# Patient Record
Sex: FEMALE | Race: BLACK OR AFRICAN AMERICAN | NOT HISPANIC OR LATINO | Employment: FULL TIME | URBAN - METROPOLITAN AREA
[De-identification: names, ages, dates, MRNs, and addresses within clinical notes are randomized per-mention and may not be internally consistent; named-entity substitution may affect disease eponyms.]

---

## 2024-10-08 ENCOUNTER — TELEPHONE (OUTPATIENT)
Dept: ORTHOPEDIC SURGERY | Facility: CLINIC | Age: 54
End: 2024-10-08

## 2024-10-08 NOTE — TELEPHONE ENCOUNTER
Caller: Neha Beauchamp    Relationship to patient: Self    Best call back number: [phone] (home)     Patient is needing: PATIENT WANTS TO MAKE SURE THAT THE OFFICE ACCEPTS FEDERAL WC CLAIMS.   THE PATIENT WILL BE COMING IN ON 11/18/24 FOR A WC INJURY BUT WAS TOLD BY HER EMPLOYER ([facility]) THAT SOME PARTS OF [facility] DO NOT ACCEPT WC FROM A FEDERAL EMPLOYER/DEPT OF LABOR   PLEASE ADVISE PATIENT ON THIS     CLAIM # [id number]  DEPT OF LABOR  # [phone]

## 2024-10-23 NOTE — TELEPHONE ENCOUNTER
Hub staff attempted to follow warm transfer process and was unsuccessful     Caller: Neha Beauchamp    Relationship to patient: Self    Best call back number: [phone]    Patient is needing: PATIENT STATES THAT HE WORKERS COMP IS THROUGH THE DEPT OF LABOR AND [facility] THEIR LIST OF PARTICIPATING PROVIDERS. PLEASE CALL TO DISCUSS.  [facility] PH: [phone]  CLAIM# [id number]

## 2024-10-29 NOTE — TELEPHONE ENCOUNTER
Artemio Amanda, this patient is calling again to check on status. It looks like there is some new info in previous message.